# Patient Record
Sex: FEMALE
[De-identification: names, ages, dates, MRNs, and addresses within clinical notes are randomized per-mention and may not be internally consistent; named-entity substitution may affect disease eponyms.]

---

## 2018-02-08 NOTE — DIAGNOSTIC IMAGING REPORT
PROCEDURE:X-RAY CERVICAL SPINE, THREE VIEWS

 

COMPARISON:None.

 

INDICATIONS:NECK PAIN 

 

FINDINGS:

The lateral view is visualized from the skull base to T1. The vertebral 

bodies are well-aligned. There are no fractures, lytic or blastic 

lesions. Mild disc space narrowing at C5-C6 with posterior disc 

osteophyte complex. The C1/C2-odontoid interval is normal. The tip of 

the dens on odontoid view is obscured by dental hardware. The 

pre-vertebral soft tissues are normal. 

 

CONCLUSION:

Mild disc space narrowing at C5-C6 with posterior disc osteophyte 

complex.

 

 

Dictated by:  David Perez M.D. on 2/08/2018 at 19:47     

Electronically approved by:  David Perez M.D. on 2/08/2018 at 19:47

## 2021-06-18 ENCOUNTER — HOSPITAL ENCOUNTER (OUTPATIENT)
Dept: HOSPITAL 88 - DX | Age: 61
End: 2021-06-18
Attending: FAMILY MEDICINE
Payer: COMMERCIAL

## 2021-06-18 DIAGNOSIS — K21.9: Primary | ICD-10-CM

## 2021-06-18 PROCEDURE — 74246 X-RAY XM UPR GI TRC 2CNTRST: CPT
